# Patient Record
(demographics unavailable — no encounter records)

---

## 2024-10-08 NOTE — HISTORY OF PRESENT ILLNESS
[de-identified] : Pt presents for followup  - on Benicar for HTN, no SE -fatty liver/mild hyperlipidemia, trying to improve dietarily -Dealing with hot flashes, tried hormonal therapy with benefit but had side effect of rash therefore discontinued. -Patient is not fasting

## 2024-10-08 NOTE — HISTORY OF PRESENT ILLNESS
[de-identified] : Pt presents for followup  - on Benicar for HTN, no SE -fatty liver/mild hyperlipidemia, trying to improve dietarily -Dealing with hot flashes, tried hormonal therapy with benefit but had side effect of rash therefore discontinued. -Patient is not fasting

## 2024-10-08 NOTE — ASSESSMENT
[FreeTextEntry1] : Venipuncture done in our office, Labs sent out.Patient advised to continue present medications with diet/exercise and specialists followup. Patient will return to the office in april for CP  Flu shot=declines covid vaccine d/w pt=declines TDAP=UTD MG= 9/2024 Colonoscopy 12/2018, next at 51 y/o MD's 1.gyn-Dr. Bateman 2.GI-Dr. Sandra 3.Neuro-Dr. Rosales 4.N/S-Dr De Dios 5.hematology-Dr. Pitts= no follow-up needed 6.Derm-Dr. Anderson MRI/MRA brain via neuro. abd sono 5/2023=+fatty liver/angiomyolipoma of left kidney.

## 2024-10-08 NOTE — ASSESSMENT
[FreeTextEntry1] : Venipuncture done in our office, Labs sent out.Patient advised to continue present medications with diet/exercise and specialists followup. Patient will return to the office in april for CP  Flu shot=declines covid vaccine d/w pt=declines TDAP=UTD MG= 9/2024 Colonoscopy 12/2018, next at 49 y/o MD's 1.gyn-Dr. Bateman 2.GI-Dr. Sandra 3.Neuro-Dr. Rosales 4.N/S-Dr De Dios 5.hematology-Dr. Pitts= no follow-up needed 6.Derm-Dr. Anderson MRI/MRA brain via neuro. abd sono 5/2023=+fatty liver/angiomyolipoma of left kidney.

## 2024-12-04 NOTE — PHYSICAL EXAM
[No Acute Distress] : no acute distress [No Respiratory Distress] : no respiratory distress  [Clear to Auscultation] : lungs were clear to auscultation bilaterally [Normal Rate] : normal rate  [Regular Rhythm] : with a regular rhythm [Normal Affect] : the affect was normal [Normal Mood] : the mood was normal [Normal Appearance] : normal in appearance [No Masses] : no palpable masses [No Nipple Discharge] : no nipple discharge [de-identified] : left breast is normal [de-identified] : + Reproducible left pectoralis/left anterior rib discomfort, no bruising, +worse with mvt up and down table

## 2024-12-04 NOTE — HISTORY OF PRESENT ILLNESS
[FreeTextEntry8] : Patient states she was playing flag football the day before Thanksgiving and fell to the ground/got injured and is not sure if her knee or arm hit into her left chest area and knocked the wind out of her.  Patient states the next day she went to the walk-in clinic and had rib x-ray which she reports as negative.  Patient has been taking OTC pain reliever without benefit.  Patient denies dyspnea/bruising in the area.  Patient states symptoms are worse with movement

## 2024-12-04 NOTE — ASSESSMENT
[FreeTextEntry1] : Flexeril/naproxen given, patient told to apply heat.  Will call clinic to obtain x-rays.  Patient will call if symptoms continue or worsen and return to the office as scheduled   Dr. Rodriguez was present in office building while I examined patient

## 2025-04-14 NOTE — HISTORY OF PRESENT ILLNESS
[de-identified] : 49-year-old female presents for her annual wellness visit   Most significant history is patient with cerebral aneurysms with multiple clippings in 2017 with most recent MRA December 2021 being stable and neurosurgical follow-up December 2022 with plan follow-up MRA in 5 years.  Patient also recently diagnosed with hypertension December 2022 and on olmesartan 20 mg daily with tolerance and improved blood pressure.  History also includes iron deficiency anemia for which the patient was seen by gastroenterology and had colonoscopy in 2018.  The feeling was the patient anemia was secondary to dysfunctional uterine bleeding. Patient is now perimenopausal with infrequent menstrual flow and improved anemia. Recent blood work also showed elevated platelets which has improved also. 2023 she had persistently increased white blood cell count therefore sore hematology feeling no issue and recommended monitoring.  Otherwise patient without any other history including cardiopulmonary, hepatorenal, diabetes or cancer.  Overall she feels well without any complaints including no chest pain, palpitations, shortness of breath or edema. Patient is perimenopausal with symptoms stating GYN has tried different regimens to help her with her symptoms with no success thus far. Patient has made some good lifestyle changes with diet and exercise.  Some weight gain  She continues to work as an  in the [employer] and is  with 2 children a daughter who is 20 in [facility] and a son a kirit in high school at 17 years old.

## 2025-04-14 NOTE — HEALTH RISK ASSESSMENT
[Good] : ~his/her~ current health as good [None] : Patient does not have any barriers to medication adherence [Yes] : Reviewed medication list for presence of high-risk medications. [NO] : No [Audit-CScore] : 2 [de-identified] : daily exercise [de-identified] : good [XBF0Oywmh] : 0 [Change in mental status noted] : No change in mental status noted [Reports changes in hearing] : Reports no changes in hearing [Reports changes in vision] : Reports no changes in vision [Reports changes in dental health] : Reports no changes in dental health [PapSmearDate] : 04/24 [AdvancecareDate] : 04/25

## 2025-04-14 NOTE — HEALTH RISK ASSESSMENT
[Good] : ~his/her~ current health as good [None] : Patient does not have any barriers to medication adherence [Yes] : Reviewed medication list for presence of high-risk medications. [NO] : No [Audit-CScore] : 2 [de-identified] : daily exercise [de-identified] : good [[id number]] : 0 [Change in mental status noted] : No change in mental status noted [Reports changes in hearing] : Reports no changes in hearing [Reports changes in vision] : Reports no changes in vision [Reports changes in dental health] : Reports no changes in dental health [PapSmearDate] : 04/24 [AdvancecareDate] : 04/25

## 2025-04-14 NOTE — ASSESSMENT
[FreeTextEntry1] : 49-year-old female with significant history of cerebral aneurysms with multiple clippings in 2017 with most recent MRI December 2021 stable with plan follow-up MRA in 5 years.  Hypertension diagnosed December 2022 now on olmesartan with good blood pressure.  History of iron deficiency anemia likely secondary to dysfunctional uterine bleeding which is not an issue any longer with patient perimenopausal.  No further iron deficiency Blood work will be done today including CBC  Complaining of bilateral foot pain therefore given referral to see podiatry  Follow-up with GYN concerning perimenopausal symptoms  Mammography September 2024 GYN April 2024 Colonoscopy 2018 with follow-up at 50 years old therefore referral given with patient turning 50 in August  Patient declines COVID and influenza vaccines Tdap given April 2024  Follow-up in 6 months Venipuncture done today in the office

## 2025-04-14 NOTE — HISTORY OF PRESENT ILLNESS
[de-identified] : 49-year-old female presents for her annual wellness visit   Most significant history is patient with cerebral aneurysms with multiple clippings in 2017 with most recent MRA December 2021 being stable and neurosurgical follow-up December 2022 with plan follow-up MRA in 5 years.  Patient also recently diagnosed with hypertension December 2022 and on olmesartan 20 mg daily with tolerance and improved blood pressure.  History also includes iron deficiency anemia for which the patient was seen by gastroenterology and had colonoscopy in 2018.  The feeling was the patient anemia was secondary to dysfunctional uterine bleeding. Patient is now perimenopausal with infrequent menstrual flow and improved anemia. Recent blood work also showed elevated platelets which has improved also. 2023 she had persistently increased white blood cell count therefore sore hematology feeling no issue and recommended monitoring.  Otherwise patient without any other history including cardiopulmonary, hepatorenal, diabetes or cancer.  Overall she feels well without any complaints including no chest pain, palpitations, shortness of breath or edema. Patient is perimenopausal with symptoms stating GYN has tried different regimens to help her with her symptoms with no success thus far. Patient has made some good lifestyle changes with diet and exercise.  Some weight gain  She continues to work as an  in the [employer] school and is  with 2 children a daughter who is 20 in [facility] and a son a kirit in high school at 17 years old.

## 2025-04-14 NOTE — HISTORY OF PRESENT ILLNESS
[de-identified] : 49-year-old female presents for her annual wellness visit   Most significant history is patient with cerebral aneurysms with multiple clippings in 2017 with most recent MRA December 2021 being stable and neurosurgical follow-up December 2022 with plan follow-up MRA in 5 years.  Patient also recently diagnosed with hypertension December 2022 and on olmesartan 20 mg daily with tolerance and improved blood pressure.  History also includes iron deficiency anemia for which the patient was seen by gastroenterology and had colonoscopy in 2018.  The feeling was the patient anemia was secondary to dysfunctional uterine bleeding. Patient is now perimenopausal with infrequent menstrual flow and improved anemia. Recent blood work also showed elevated platelets which has improved also. 2023 she had persistently increased white blood cell count therefore sore hematology feeling no issue and recommended monitoring.  Otherwise patient without any other history including cardiopulmonary, hepatorenal, diabetes or cancer.  Overall she feels well without any complaints including no chest pain, palpitations, shortness of breath or edema. Patient is perimenopausal with symptoms stating GYN has tried different regimens to help her with her symptoms with no success thus far. Patient has made some good lifestyle changes with diet and exercise.  Some weight gain  She continues to work as an  in the [employer] school and is  with 2 children a daughter who is 20 in [facility] and a son a kirit in high school at 17 years old.

## 2025-04-14 NOTE — HEALTH RISK ASSESSMENT
[Good] : ~his/her~ current health as good [None] : Patient does not have any barriers to medication adherence [Yes] : Reviewed medication list for presence of high-risk medications. [NO] : No [Audit-CScore] : 2 [de-identified] : daily exercise [de-identified] : good [RUB0Iisks] : 0 [Change in mental status noted] : No change in mental status noted [Reports changes in hearing] : Reports no changes in hearing [Reports changes in vision] : Reports no changes in vision [Reports changes in dental health] : Reports no changes in dental health [PapSmearDate] : 04/24 [AdvancecareDate] : 04/25

## 2025-06-19 NOTE — ASSESSMENT
[FreeTextEntry1] : At this time she will be scheduled for an initial screening colonoscopy as the prior exam was diagnostic.  2 weeks prior to the colonoscopy she will obtain repeat liver function test as well as a full set of hepatitis serologies except for the hepatitis C antibody.  In all probability the intermittent and very mild elevation of the transaminases that is due to her fatty liver as a result of her weight and hyperlipidemia.  She will utilize a Suflave prep. The risks, benefits, complications and possible adverse consequences associated with colonoscopy were discussed with the patient.

## 2025-06-19 NOTE — PHYSICAL EXAM
[Alert] : alert [Normal Voice/Communication] : normal voice/communication [Healthy Appearing] : healthy appearing [No Acute Distress] : no acute distress [Sclera] : the sclera and conjunctiva were normal [Hearing Threshold Finger Rub Not [place]] : hearing was normal [Normal Lips/Gums] : the lips and gums were normal [Oropharynx] : the oropharynx was normal [Normal Appearance] : the appearance of the neck was normal [No Respiratory Distress] : no respiratory distress [No Acc Muscle Use] : no accessory muscle use [Respiration, Rhythm And Depth] : normal respiratory rhythm and effort [Auscultation Breath Sounds / Voice Sounds] : lungs were clear to auscultation bilaterally [Heart Rate And Rhythm] : heart rate was normal and rhythm regular [Normal S1, S2] : normal S1 and S2 [Murmurs] : no murmurs [Bowel Sounds] : normal bowel sounds [Abdomen Tenderness] : non-tender [No Masses] : no abdominal mass palpated [Abdomen Soft] : soft [] : no hepatosplenomegaly [Oriented To Time, Place, And Person] : oriented to person, place, and time

## 2025-06-19 NOTE — HISTORY OF PRESENT ILLNESS
[FreeTextEntry1] : In 2018 the patient underwent a diagnostic colonoscopy by me for evaluation of iron deficiency anemia which was unremarkable.  The iron deficiency was thought to be due to her underlying menses.  Since that time she has become perimenopausal and the anemia has resolved.  She was last seen in November 2020 complaining of very small and pellet-like stools despite taking Metamucil.  Nevertheless she was otherwise asymptomatic.  At that time I recommended that she continue to take Metamucil daily but that she add a stool softener twice daily.  I also suggested that she try taking a probiotic.  Lastly I recommended that she undergo a screening colonoscopy at the age of 50.  In addition, at the time of her most recent comprehensive metabolic profile in mid April the AST was slightly elevated at 52 with an ALT slightly elevated at 63.  Prior liver function test had been normal.  An abdominal sonogram in June 2023 revealed mild diffuse hepatic steatosis with no other significant abnormalities.  At that time she also exhibited some minimal elevations of the transaminases.  A recent hepatitis C antibody was negative.  She returns today for routine screening colonoscopy.  She no longer requires fiber supplements or stool softeners and is having a normal daily bowel movement.  There is no rectal bleeding.  There is no family history of colon cancer.  Her appetite is good and she has had some slowly increasing weight.  She denies any abdominal pain, nausea or vomiting.  There are no dyspeptic symptoms.